# Patient Record
Sex: FEMALE | Race: WHITE
[De-identification: names, ages, dates, MRNs, and addresses within clinical notes are randomized per-mention and may not be internally consistent; named-entity substitution may affect disease eponyms.]

---

## 2020-09-11 ENCOUNTER — HOSPITAL ENCOUNTER (OUTPATIENT)
Dept: HOSPITAL 46 - OPS | Age: 40
Discharge: HOME | End: 2020-09-11
Attending: OTOLARYNGOLOGY
Payer: COMMERCIAL

## 2020-09-11 VITALS — HEIGHT: 68 IN | WEIGHT: 293 LBS | BODY MASS INDEX: 44.41 KG/M2

## 2020-09-11 DIAGNOSIS — Z88.8: ICD-10-CM

## 2020-09-11 DIAGNOSIS — I10: ICD-10-CM

## 2020-09-11 DIAGNOSIS — G47.30: ICD-10-CM

## 2020-09-11 DIAGNOSIS — Z88.5: ICD-10-CM

## 2020-09-11 DIAGNOSIS — K13.70: Primary | ICD-10-CM

## 2020-09-11 DIAGNOSIS — Z99.89: ICD-10-CM

## 2020-09-11 DIAGNOSIS — E11.9: ICD-10-CM

## 2020-09-11 DIAGNOSIS — J45.909: ICD-10-CM

## 2020-09-11 DIAGNOSIS — Z79.899: ICD-10-CM

## 2020-09-11 PROCEDURE — 0CBM0ZZ EXCISION OF PHARYNX, OPEN APPROACH: ICD-10-PCS | Performed by: OTOLARYNGOLOGY

## 2020-09-11 NOTE — NUR
PT ABLE TO VOID QS WITH NO PROBLEMS, BACK TO ROOM TO GET DRESSED. DC
INSTRUCTIONS PRESENTED TO PT, NO QUESTIONS ASKED. PT FAMILY NOTIFIED AND IN
ROUTE TO PICK PT UP, WILL CALL WHEN THEY ARRIVE.

## 2020-09-11 NOTE — NUR
PT RESTING WITH CPAP IN PLACE ON ENTRANCE TO PT ROOM. PT WAKES WITH TEMP
ACROSS FOREHEAD. PT DENIES ANY NAUSEA AND STATES PAIN 3/10 AND IS NOT SURE IF
IT IS FROM THE "TUBE" OR "SURGERY". PT DENIES PAIN MEDICATION AT THIS TIME. PT
UP TO BATHROOM WITH RN ASSIST. DENIES ANY DIZZINESS WITH AMBULATION, STEADY
GAIT.

## 2020-09-11 NOTE — NUR
PT IS ALERT, ORIENTED AND SEEMS PREPARED. PT WAITING FOR DR JACOBO TO VISIT. ALL
QUESTIONS ASKED WERE ANSWERED. PT DID REQUEST PRAYER, WILL HAVE FRIEND COME
FOLLOWING DC

## 2020-09-11 NOTE — NUR
PT ARRIVES BACK TO DS RM 12 FROM PACU DROWSY. PT AROUSES TO VERBAL STIMULATION
AND IS ABLE TO DENY ANY PAIN OR NAUSEA, THEN QUICKLY FALLS BACK TO SLEEP. PT
SATS DROP TO LOW 90'S AND PERSONAL CPAP IS PLACED WITH STERILE WATER. CALL
LIGHT WITHIN REACH. PT TOLERATES ICED WATER WITH NO PROBLEMS.

## 2020-09-11 NOTE — NUR
09/11/20 0813 Candida Escobar
0807 PATIENT ARRIVES TO PACU AWAKE OFF/ON. RESP EVEN AND UNLABORED,
MASK AT 6 LITERS. PATIENT DENIES PAIN OR NAUSEA.
0812 CBG DONE. PATIENT DENIES NEEDS.

## 2020-09-14 NOTE — OR
[facility]
                                    [address], [place]  [address]
_________________________________________________________________________________________
                                                                 Signed   
 
 
DATE OF OPERATION:
09/11/2020
 
SURGEON:
Tigre Echols MD
 
PREOPERATIVE DIAGNOSIS:
Tongue lesion of the dorsal base of tongue.
 
POSTOPERATIVE DIAGNOSIS:
Tongue lesion of the dorsal base of tongue.
 
PROCEDURE:
Excisional biopsy of tongue lesion.
 
INDICATIONS:
This 40-year-old female presented to the office, referred there by her primary caregiver
for a discovered tongue lesion and this was confirmed, it was about 1 cm or so in
diameter, exophytic in the area of the lingual tonsil of the right side near the gutter.
 This persisted for weeks and is painless, so biopsy was indicated to make sure it was
some lymphoproliferative disorder. 
 
DESCRIPTION OF PROCEDURE:
The patient was placed in the supine position and had an orotracheal intubation, she was
placed under general anesthesia.  Using a Sweetheart or similar retractor, the assistant
pulled down on the nose or on the retractors exposing the lesion, which was then grasped
with DeBakey forceps with pair of Metzenbaum scissors, was removed as close as possible
to the base.  It was fibrotic and tough consistent with a fibroma more than lymphoid
tissue.  It was sent in formalin for examination under microscope and the base was
cauterized with a couple of short bursts of cautery with a handpiece cauterization set
at 30.  The patient was then awakened and extubated and sent to the recovery in good
condition without incident.  She tolerated the procedure well.  Estimated blood loss was
a milliliter or two.  The patient will be discharged today to go home and should do fine
without any other manipulation.  She will be seen back in the office and notified of the
results of the biopsy. 
 
 
 
            ________________________________________
            Tigre Echols MD 
 
 
 
                                                                                    
_________________________________________________________________________________________
PATIENT NAME:     BOB ROLDAN                      
MEDICAL RECORD #: [id number]            OPERATIVE REPORT              
          ACCT #: [id number]  
DATE OF BIRTH:   [birth year]            REPORT #: [id number]      
PHYSICIAN:        TIGRE ECHOLS MD              
PCP:              MIC ESPINOSA NP               
REPORT IS CONFIDENTIAL AND NOT TO BE RELEASED WITHOUT AUTHORIZATION
 
 
                                  [address] [place]  [address]
_________________________________________________________________________________________
                                                                 Signed   
 
 
[facility]
Job #:  [id number]
DD:  09/11/2020 08:05:51
DT:  09/11/2020 11:34:13
 
 
Copies:                                
~
 
 
 
 
 
 
 
 
 
 
 
 
 
 
 
 
 
 
 
 
 
 
 
 
 
 
 
 
 
 
 
 
 
 
 
                                                                                    
_________________________________________________________________________________________
PATIENT NAME:     BOB ROLDAN                      
MEDICAL RECORD #: [id number]            OPERATIVE REPORT              
          ACCT #: [id number]  
DATE OF BIRTH:   [birth year]            REPORT #: [id number]      
PHYSICIAN:        TIGRE ECHOLS MD              
PCP:              MIC ESPINOSA NP               
REPORT IS CONFIDENTIAL AND NOT TO BE RELEASED WITHOUT AUTHORIZATION